# Patient Record
(demographics unavailable — no encounter records)

---

## 2025-01-28 NOTE — REASON FOR VISIT
[Follow-up] : a follow-up of an existing diagnosis [Family Member] : family member [FreeTextEntry1] : stomach pain and weight loss

## 2025-01-28 NOTE — ASSESSMENT
[FreeTextEntry1] : 9/2021: 85 yo female, history of atrial fibrillation, cardiologist is Armando Bartholomew MD. Pt maintained on asa 81mg plus Eliquis. Has had dark stool/red blood according to daughter. Pt denies chest pain or significant constipation. No dyspnea at rest. Was seen in May for similar complaints, rx hydrocortisone suppositories, but daughter did not administer.    RTO today 1/28/25 for stomach pain and weight loss since 4/2024. Daughter noted at bedside. Patient [place], complains of acid reflux/regurgitation and cramping abdominal pain before/after meals. Hx PPM and cardiac stent. Denies sob, cp, fevers or altered BMS. Lidocaine patches and heating pad help with abdominal discomfort. Discussed and reviewed documents from recent ER visit 11/2024. Reviewed lab work, CT 12/31/25 negative. Patient underwent upper GI series 4/2024, possible esophagitis. Currently taking pantoprazole 40 mg daily, no relief. Discussed adding famotidine 40 mg daily qHS and trial of dicyclomine 10 mg TID, agreeable. Will follow up progress within 1-2 weeks.  Plan: 1. Rx sent for famotidine 40 mg daily qHS and trial of dicyclomine 10 mg TID AC 2. Follow up 1-2 weeks.

## 2025-01-28 NOTE — PHYSICAL EXAM
[Alert] : alert [Normal Voice/Communication] : normal voice/communication [Healthy Appearing] : healthy appearing [No Acute Distress] : no acute distress [Sclera] : the sclera and conjunctiva were normal [Hearing Threshold Finger Rub Not [place]] : hearing was normal [Normal Lips/Gums] : the lips and gums were normal [Normal Appearance] : the appearance of the neck was normal [Oropharynx] : the oropharynx was normal [No Neck Mass] : no neck mass was observed [No Respiratory Distress] : no respiratory distress [No Acc Muscle Use] : no accessory muscle use [Respiration, Rhythm And Depth] : normal respiratory rhythm and effort [Auscultation Breath Sounds / Voice Sounds] : lungs were clear to auscultation bilaterally [Heart Rate And Rhythm] : heart rate was normal and rhythm regular [Normal S1, S2] : normal S1 and S2 [Murmurs] : no murmurs [Bowel Sounds] : normal bowel sounds [Abdomen Tenderness] : non-tender [No Masses] : no abdominal mass palpated [Abdomen Soft] : soft [] : no hepatosplenomegaly [Oriented To Time, Place, And Person] : oriented to person, place, and time

## 2025-01-28 NOTE — HISTORY OF PRESENT ILLNESS
[FreeTextEntry1] : 9/2021: 83 yo female, history of atrial fibrillation, cardiologist is Armando Bartholomew MD. Pt maintained on asa 81mg plus Eliquis. Has had dark stool/red blood according to daughter. Pt denies chest pain or significant constipation. No dyspnea at rest. Was seen in May for similar complaints, rx hydrocortisone suppositories, but daughter did not administer.    RTO today 1/28/25 for stomach pain and weight loss since 4/2024. Daughter noted at bedside. Patient [place], complains of acid reflux/regurgitation and cramping abdominal pain before/after meals. Hx PPM and cardiac stent. Denies sob, cp, fevers or altered BMS. Lidocaine patches and heating pad help with abdominal discomfort. Discussed and reviewed documents from recent ER visit 11/2024. Reviewed lab work, CT 12/31/25 negative. Patient underwent upper GI series 4/2024, possible esophagitis. Currently taking pantoprazole 40 mg daily, no relief. Discussed adding famotidine 40 mg daily qHS and trial of dicyclomine 10 mg TID, agreeable. Will follow up progress within 1-2 weeks.  [de-identified] : 12/31/24: No acute thoracic, abdominal or pelvic pathology.